# Patient Record
Sex: MALE | Race: AMERICAN INDIAN OR ALASKA NATIVE | NOT HISPANIC OR LATINO | URBAN - METROPOLITAN AREA
[De-identification: names, ages, dates, MRNs, and addresses within clinical notes are randomized per-mention and may not be internally consistent; named-entity substitution may affect disease eponyms.]

---

## 2020-02-16 ENCOUNTER — EMERGENCY (EMERGENCY)
Facility: HOSPITAL | Age: 20
LOS: 0 days | Discharge: HOME | End: 2020-02-16
Attending: EMERGENCY MEDICINE | Admitting: EMERGENCY MEDICINE
Payer: MEDICAID

## 2020-02-16 VITALS
RESPIRATION RATE: 20 BRPM | DIASTOLIC BLOOD PRESSURE: 93 MMHG | TEMPERATURE: 168 F | SYSTOLIC BLOOD PRESSURE: 146 MMHG | WEIGHT: 166.67 LBS | HEART RATE: 80 BPM | OXYGEN SATURATION: 98 %

## 2020-02-16 DIAGNOSIS — R20.2 PARESTHESIA OF SKIN: ICD-10-CM

## 2020-02-16 DIAGNOSIS — R20.0 ANESTHESIA OF SKIN: ICD-10-CM

## 2020-02-16 PROCEDURE — 99283 EMERGENCY DEPT VISIT LOW MDM: CPT

## 2020-02-16 NOTE — ED PROVIDER NOTE - ATTENDING CONTRIBUTION TO CARE
19yM p/w intermittent paresthesias - reports paresthesias at/below R jaw since last week - seen at [facility] and had stroke workup, discharged w/ neg findings.  Pt reports intact sensation but still intermittent paresthesias (occ w/ perceived difficulty swallowing, no drooling/stridor, no such sensation at present).  Reports transient paresthesias to L hand 4th/5th fingers a few days ago that has since resolved.  No trauma/fevers/difficulty ambulating.  Saw his PCP since [facility] visit and was told it was likely anxiety, but jaw paresthesias still intermittently occurring, so he came to the ED.    CONSTITUTIONAL: well developed; well nourished; well appearing in no acute distress  HEAD: normocephalic; atraumatic  EYES: PERRL, no conjunctival injection, no scleral icterus  ENT: no nasal discharge; airway clear.  NECK: supple; non tender. + full passive ROM in all directions  CARD: warm and well perfused, not tachycardic  RESP: breathing comfortably on RA, speaking in full sentences w/o distress  EXT: moving all extremities spontaneously, normal ROM. No clubbing, cyanosis or edema  SKIN: warm and dry, no lesions noted  NEURO: alert, oriented, CN II-XII grossly intact,motor and sensory grossly intact, speech nonslurred, FTN normal b/l, stable gait and tandem gait, no focal deficits. GCS 15  PSYCH: calm, cooperative, appropriate, good eye contact, logical thought process, no apparent danger to self or others

## 2020-02-16 NOTE — ED PROVIDER NOTE - CLINICAL SUMMARY MEDICAL DECISION MAKING FREE TEXT BOX
19yM p/w intermittent R jaw paresthesias in the setting of recent comprehensive stroke evaluation.  Pt well appearing w/ nonfocal neuro exam and no current social concerns or known high risk behaviors.  Recommend supportive care, o/p neuro f/u, return precautions.

## 2020-02-16 NOTE — ED PROVIDER NOTE - OBJECTIVE STATEMENT
20 yo male w/ h/o of high cholesterol diet controlled presents with right sided facial numbness. Symptoms began on Tuesday (5 day ago) with right sided numbness and tongue numbness. Patient went to [facility] and underwent stroke neurological evaluation and was cleared to follow up. The next day (4 days ago) patient went to his regular doctor and complained of the addition of leftt sided tingling in the two last fingers and right should muscle twitching. Pediatrician thought it was stress related. Now patient continues to have the right sided numbness on his face, the left sided hand tingling has resolved. He denies HA, n/v/d, blurry vision, change in vision, ringing in ears, dizziness, chest pain, SOB. Patient has had heart racing since prior to this happening. Patient denies alcohol, cigarettes, caffeine, marijuana, cocaine, or any other drugs.

## 2020-02-16 NOTE — ED PROVIDER NOTE - NS ED ROS FT
REVIEW OF SYSTEMS:  CONSTITUTIONAL: No weakness, fevers or chills  EYES/ENT: No visual changes;  No vertigo or throat pain   NECK: No pain or stiffness  RESPIRATORY: No cough, wheezing, hemoptysis; No shortness of breath  CARDIOVASCULAR: No chest pain or palpitations  GASTROINTESTINAL: No abdominal or epigastric pain. No nausea, vomiting, or hematemesis; No diarrhea or constipation. No melena or hematochezia.  GENITOURINARY: No dysuria, frequency or hematuria  NEUROLOGICAL: See hpi  SKIN: No itching, rashes

## 2020-02-16 NOTE — ED PROVIDER NOTE - CARE PLAN
Principal Discharge DX:	Numbness and tingling  Assessment and plan of treatment:	Neurology follow up Principal Discharge DX:	Paresthesias  Assessment and plan of treatment:	Neurology follow up

## 2020-02-16 NOTE — ED PROVIDER NOTE - PATIENT PORTAL LINK FT
You can access the FollowMyHealth Patient Portal offered by [facility] by registering at the following website: [email or web address]. By joining [facility]’s FollowMyHealth portal, you will also be able to view your health information using other applications (apps) compatible with our system.

## 2020-02-16 NOTE — ED PEDIATRIC TRIAGE NOTE - CHIEF COMPLAINT QUOTE
Pt presenting with left arm tingling and numbness since Wednesday.  Pt was seen at [facility] on Tuesday for right side facial numbness and tingling

## 2020-02-16 NOTE — ED PROVIDER NOTE - NSFOLLOWUPCLINICS_GEN_ALL_ED_FT
[facility]  Neurology  [address] [place] [address], [facility] [address]  [place] [address]  Phone: [phone]  Fax:   Follow Up Time:

## 2020-02-16 NOTE — ED PROVIDER NOTE - PHYSICAL EXAMINATION
General: well appearing, in no distress  HEENT: eyes PERRLA, TM visualized with no erythema or exudate, throat non erythematous, neck supple w/ FROM and no adenopathy  CVS: S1, S2 no murmurs  RESP: CTAB/L no wheezes, rhonchi or rales  AB: +BS, soft, nontender, nondistended  Neuro: Awake, alert and appropriate for age General: well appearing, in no distress  HEENT: eyes PERRLA, TM visualized with no erythema or exudate, throat non erythematous, neck supple w/ FROM and no adenopathy  CVS: S1, S2 no murmurs  RESP: CTAB/L no wheezes, rhonchi or rales  AB: +BS, soft, nontender, nondistended  Neuro: Awake, alert and appropriate for age. CN 2-12 intact, strength 5/5 through, sensation intact in all facial, extremity and truncal dermatomes, Triceps reflexes normal, patella reflexes normal, gait normal, neg trendelenburg, no spinal tenderness

## 2020-02-16 NOTE — ED PEDIATRIC NURSE NOTE - CHIEF COMPLAINT QUOTE
Pt presenting with left arm tingling and numbness since Wednesday.  Pt was seen at [facility] on Tuesday for right side facial numbness and tingling

## 2020-02-18 PROBLEM — Z00.00 ENCOUNTER FOR PREVENTIVE HEALTH EXAMINATION: Status: ACTIVE | Noted: 2020-02-18

## 2020-02-18 PROBLEM — Z78.9 OTHER SPECIFIED HEALTH STATUS: Chronic | Status: ACTIVE | Noted: 2020-02-16

## 2020-04-02 ENCOUNTER — APPOINTMENT (OUTPATIENT)
Dept: NEUROLOGY | Facility: CLINIC | Age: 20
End: 2020-04-02

## 2020-05-14 ENCOUNTER — TRANSCRIPTION ENCOUNTER (OUTPATIENT)
Age: 20
End: 2020-05-14

## 2020-05-14 ENCOUNTER — APPOINTMENT (OUTPATIENT)
Dept: NEUROLOGY | Facility: CLINIC | Age: 20
End: 2020-05-14
Payer: SELF-PAY

## 2020-05-14 DIAGNOSIS — R20.0 ANESTHESIA OF SKIN: ICD-10-CM

## 2020-05-14 DIAGNOSIS — R20.2 ANESTHESIA OF SKIN: ICD-10-CM

## 2020-05-14 PROCEDURE — 99203 OFFICE O/P NEW LOW 30 MIN: CPT | Mod: 95

## 2020-05-14 NOTE — HISTORY OF PRESENT ILLNESS
[Verbal consent obtained from patient] : the patient, [unfilled] [Time Spent: ___ minutes] : I have spent [unfilled] minutes with the patient on the telephone [FreeTextEntry1] : SERGE LYONS is a 20 year old M with no significant medical history is here to be seen as a new patient via TeleHealth visit. Needed to continue part of the visit via [employer] due to technical difficulties. He reports noticing symptoms of numbness affecting right side of his face, around his mouth and tongue lasting for few hours to one day. He was seen two times in [facility] where CT head was normal. In addition he reports intermittent numbness and tingling sensation in the left arm, forearm and  last three fingers of left hand and both of his feet. He denies any weakness or walking. He has no significant past medical or any significant medical history in his family. Sometimes he feels dizzy with the numbness.

## 2020-05-22 ENCOUNTER — APPOINTMENT (OUTPATIENT)
Dept: NEUROLOGY | Facility: CLINIC | Age: 20
End: 2020-05-22

## 2020-08-17 ENCOUNTER — APPOINTMENT (OUTPATIENT)
Dept: NEUROLOGY | Facility: CLINIC | Age: 20
End: 2020-08-17

## 2020-12-15 ENCOUNTER — NON-APPOINTMENT (OUTPATIENT)
Age: 20
End: 2020-12-15

## 2021-03-04 ENCOUNTER — APPOINTMENT (OUTPATIENT)
Dept: GASTROENTEROLOGY | Facility: CLINIC | Age: 21
End: 2021-03-04

## 2021-06-03 ENCOUNTER — APPOINTMENT (OUTPATIENT)
Dept: GASTROENTEROLOGY | Facility: CLINIC | Age: 21
End: 2021-06-03
Payer: MEDICAID

## 2021-06-03 DIAGNOSIS — Z82.49 FAMILY HISTORY OF ISCHEMIC HEART DISEASE AND OTHER DISEASES OF THE CIRCULATORY SYSTEM: ICD-10-CM

## 2021-06-03 DIAGNOSIS — R14.0 ABDOMINAL DISTENSION (GASEOUS): ICD-10-CM

## 2021-06-03 DIAGNOSIS — Z78.9 OTHER SPECIFIED HEALTH STATUS: ICD-10-CM

## 2021-06-03 DIAGNOSIS — R10.9 UNSPECIFIED ABDOMINAL PAIN: ICD-10-CM

## 2021-06-03 PROCEDURE — 99204 OFFICE O/P NEW MOD 45 MIN: CPT | Mod: 95

## 2021-06-03 NOTE — PHYSICAL EXAM
[General Appearance - Alert] : alert [Hearing Threshold Finger Rub Not [place]] : hearing was normal [] : no respiratory distress [Skin Color & Pigmentation] : normal skin color and pigmentation [Oriented To Time, Place, And Person] : oriented to person, place, and time

## 2021-06-03 NOTE — ASSESSMENT
[FreeTextEntry1] : 21 year old male patient with 1 year lower left abdominal pain, crampy in nature, associated with bloating, no change in bowel habits. \par No weight loss, blood in the stool, nausea, vomiting or dysphagia. No GERD. \par Had hx of H Pylori which was eradicated last year. \par \par Likely IBS\par will get US of abdomen

## 2021-06-03 NOTE — HISTORY OF PRESENT ILLNESS
[Home] : at home, [unfilled] , at the time of the visit. [Verbal consent obtained from patient] : the patient, [unfilled] [Medical Office: ([facility])___] : at the medical office located in  [FreeTextEntry4] : Elizabeth Greene [de-identified] : 21 year old male patient with 1 year lower left abdominal pain, crampy in nature, associated with bloating, no change in bowel habits. \par No weight loss, blood in the stool, nausea, vomiting or dysphagia. No GERD. \par Had hx of H Pylori which was eradicated last year.

## 2021-06-29 ENCOUNTER — OUTPATIENT (OUTPATIENT)
Dept: OUTPATIENT SERVICES | Facility: HOSPITAL | Age: 21
LOS: 1 days | Discharge: HOME | End: 2021-06-29
Payer: MEDICAID

## 2021-06-29 DIAGNOSIS — R10.9 UNSPECIFIED ABDOMINAL PAIN: ICD-10-CM

## 2021-06-29 PROCEDURE — 76700 US EXAM ABDOM COMPLETE: CPT | Mod: 26

## 2022-09-26 NOTE — ED PEDIATRIC TRIAGE NOTE - ESI TRIAGE ACUITY LEVEL, MLM
Pt was very labile throughout the day.  Attempted many bargaining maneuvers to try to get what he wanted.  Displayed verbal aggression throughout the day and had to be redirected multiple times.  Pt did agree to take his Zyprexa but stated he would not take it again later in the day.  PT has been updated on plan of care.     3

## 2023-08-24 ENCOUNTER — EMERGENCY (EMERGENCY)
Facility: HOSPITAL | Age: 23
LOS: 0 days | Discharge: HOME | End: 2023-08-25
Attending: STUDENT IN AN ORGANIZED HEALTH CARE EDUCATION/TRAINING PROGRAM
Payer: MEDICAID

## 2023-08-24 DIAGNOSIS — S60.512A ABRASION OF LEFT HAND, INITIAL ENCOUNTER: ICD-10-CM

## 2023-08-24 DIAGNOSIS — M25.522 PAIN IN LEFT ELBOW: ICD-10-CM
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
## 2023-08-24 PROCEDURE — 73090 X-RAY EXAM OF FOREARM: CPT | Mod: LT

## 2023-08-24 PROCEDURE — 73070 X-RAY EXAM OF ELBOW: CPT | Mod: LT

## 2023-08-24 PROCEDURE — 29105 APPLICATION LONG ARM SPLINT: CPT | Mod: LT

## 2023-08-24 PROCEDURE — 99284 EMERGENCY DEPT VISIT MOD MDM: CPT | Mod: 25

## 2023-08-25 VITALS
SYSTOLIC BLOOD PRESSURE: 132 MMHG | DIASTOLIC BLOOD PRESSURE: 76 MMHG | WEIGHT: 190.04 LBS | TEMPERATURE: 99 F | RESPIRATION RATE: 18 BRPM | OXYGEN SATURATION: 97 % | HEART RATE: 106 BPM

## 2023-08-25 VITALS — HEART RATE: 70 BPM

## 2023-08-25 PROCEDURE — 73090 X-RAY EXAM OF FOREARM: CPT | Mod: 26,LT

## 2023-08-25 PROCEDURE — 73070 X-RAY EXAM OF ELBOW: CPT | Mod: 26,LT

## 2023-08-25 RX ORDER — IBUPROFEN 200 MG
600 TABLET ORAL ONCE
Refills: 0 | Status: COMPLETED | OUTPATIENT
Start: 2023-08-25 | End: 2023-08-25

## 2023-08-25 RX ADMIN — Medication 600 MILLIGRAM(S): at 01:12

## 2023-08-25 NOTE — ED ADULT NURSE NOTE - NSFALLUNIVINTERV_ED_ALL_ED
Bed/Stretcher in lowest position, wheels locked, appropriate side rails in place/Call bell, personal items and telephone in reach/Instruct patient to call for assistance before getting out of bed/chair/stretcher/Non-slip footwear applied when patient is off stretcher/[place] to call system/Physically safe environment - no spills, clutter or unnecessary equipment/Purposeful proactive rounding/Room/bathroom lighting operational, light cord in reach

## 2023-08-25 NOTE — ED PROVIDER NOTE - ATTENDING CONTRIBUTION TO CARE
I was present for and supervised the key and critical aspects of the procedures performed during the care of the patient.    see mdm

## 2023-08-25 NOTE — ED PROVIDER NOTE - PATIENT PORTAL LINK FT
You can access the FollowMyHealth Patient Portal offered by [facility] by registering at the following website: [email or web address]. By joining [facility]’s FollowMyHealth portal, you will also be able to view your health information using other applications (apps) compatible with our system.

## 2023-08-25 NOTE — ED PROVIDER NOTE - CLINICAL SUMMARY MEDICAL DECISION MAKING FREE TEXT BOX
Imaging was ordered and reviewed by me.  Appropriate medications for patient's presenting complaints were ordered and effects were reassessed.Additional history was obtained from patient's partner. Imaging was ordered and reviewed by me.  Appropriate medications for patient's presenting complaints were ordered and effects were reassessed. Additional history was obtained from patient's partner. Patient splinted and given ortho follow up. Discussed preliminary results. Stable for outpatient follow up.

## 2023-08-25 NOTE — ED PROVIDER NOTE - PHYSICAL EXAMINATION
VITAL SIGNS: I have reviewed nursing notes and confirm.  CONSTITUTIONAL: Well-appearing, non-toxic, in NAD  SKIN: Warm dry, normal skin turgor  HEAD: NCAT. +old left periorbital abrasion   EYES: No conjunctival injection, scleral anicteric  ENT: Moist mucous membranes,   NECK: Supple; full ROM. Nontender.   EXT: +swelling to medial left elbow with tenderness over olecranon without obvious deformity or dislocation. +abrasion to left knuckles without underlying tenderness.   NEURO: Normal motor, normal sensory. CN II-XII grossly intact. Normal gait.  PSYCH: Cooperative, appropriate.

## 2023-08-25 NOTE — ED PROVIDER NOTE - CARE PLAN
Assessment and plan of treatment:	imaging, supportive care   1 Principal Discharge DX:	Elbow pain, left  Assessment and plan of treatment:	imaging, supportive care

## 2023-08-26 ENCOUNTER — EMERGENCY (EMERGENCY)
Facility: HOSPITAL | Age: 23
LOS: 0 days | Discharge: HOME | End: 2023-08-26
Attending: STUDENT IN AN ORGANIZED HEALTH CARE EDUCATION/TRAINING PROGRAM
Payer: MEDICAID

## 2023-08-26 VITALS
SYSTOLIC BLOOD PRESSURE: 146 MMHG | DIASTOLIC BLOOD PRESSURE: 83 MMHG | OXYGEN SATURATION: 99 % | HEART RATE: 59 BPM | TEMPERATURE: 99 F | RESPIRATION RATE: 18 BRPM | WEIGHT: 190.04 LBS

## 2023-08-26 VITALS — TEMPERATURE: 97 F

## 2023-08-26 DIAGNOSIS — M25.522 PAIN IN LEFT ELBOW: ICD-10-CM

## 2023-08-26 DIAGNOSIS — M79.89 OTHER SPECIFIED SOFT TISSUE DISORDERS: ICD-10-CM

## 2023-08-26 DIAGNOSIS — R20.0 ANESTHESIA OF SKIN: ICD-10-CM

## 2023-08-26 DIAGNOSIS — M79.18 MYALGIA, OTHER SITE: ICD-10-CM

## 2023-08-26 PROCEDURE — 99283 EMERGENCY DEPT VISIT LOW MDM: CPT

## 2023-08-26 PROCEDURE — 99282 EMERGENCY DEPT VISIT SF MDM: CPT

## 2023-08-26 NOTE — ED ADULT NURSE NOTE - CHIEF COMPLAINT QUOTE
pt presents w/ wrapped arm states there is no break or fracture feels that his left thumb and pointer are swollen

## 2023-08-26 NOTE — ED PROVIDER NOTE - NSFOLLOWUPCLINICS_GEN_ALL_ED_FT
[facility]  Orthpedic  [address], [place]   Phone: [phone]  Fax:   Follow Up Time: Routine

## 2023-08-26 NOTE — ED PROVIDER NOTE - CLINICAL SUMMARY MEDICAL DECISION MAKING FREE TEXT BOX
23-year-old male presenting today with left thumb and finger swelling.  Patient is hemodynamically stable and well-appearing on evaluation.  Patient is neurovascularly intact at this time.  Upon review of previous imaging, patient has no acute fractures or dislocations.  Splint was removed, patient's sensation and strength was intact and patient was discharged to follow-up with orthopedics.  Return precautions explained to patient.

## 2023-08-26 NOTE — ED ADULT NURSE NOTE - NSFALLUNIVINTERV_ED_ALL_ED
Bed/Stretcher in lowest position, wheels locked, appropriate side rails in place/Call bell, personal items and telephone in reach/Instruct patient to call for assistance before getting out of bed/chair/stretcher/Non-slip footwear applied when patient is off stretcher/[place] to call system/Physically safe environment - no spills, clutter or unnecessary equipment/Purposeful proactive rounding/Room/bathroom lighting operational, light cord in reach

## 2023-08-26 NOTE — ED PROVIDER NOTE - PHYSICAL EXAMINATION
VITAL SIGNS: I have reviewed nursing notes and confirm.  CONSTITUTIONAL: Well-developed; well-nourished; in no acute distress.  SKIN: Skin exam is warm and dry, no acute rash.  HEAD: Normocephalic; atraumatic.  ENT: mmm  EXT: LUE in posterior splint, removed by me. No swelling/discoloration noted to fingers, reports slight decreased sensation to index and thumb. FROM at all digits and joints. No skin changes, 2+ RP  NEURO: Alert, oriented. Grossly unremarkable. No focal deficits.  PSYCH: Cooperative, appropriate.

## 2023-08-26 NOTE — ED PROVIDER NOTE - OBJECTIVE STATEMENT
24 y/o M with no PMH, splint application yesterday to L elbow for pain s/p fall presents back to ED for evaluation of L thumb and index finger swelling. Pt reports since splint application has noted mild swelling and numbness to these 2 fingers. Denies new injury. Has no other complaints. XR has since officially been read as normal by radiologists

## 2023-08-26 NOTE — ED PROVIDER NOTE - PATIENT PORTAL LINK FT
You can access the FollowMyHealth Patient Portal offered by [facility] by registering at the following website: [email or web address]. By joining [facility]’s FollowMyHealth portal, you will also be able to view your health information using other applications (apps) compatible with our system.

## 2024-01-31 ENCOUNTER — EMERGENCY (EMERGENCY)
Facility: HOSPITAL | Age: 24
LOS: 0 days | Discharge: HOME | End: 2024-01-31
Attending: EMERGENCY MEDICINE
Payer: MEDICAID

## 2024-01-31 VITALS
TEMPERATURE: 98 F | RESPIRATION RATE: 16 BRPM | DIASTOLIC BLOOD PRESSURE: 74 MMHG | HEART RATE: 69 BPM | OXYGEN SATURATION: 100 % | SYSTOLIC BLOOD PRESSURE: 157 MMHG

## 2024-01-31 DIAGNOSIS — R07.89 OTHER CHEST PAIN: ICD-10-CM

## 2024-01-31 DIAGNOSIS — R05.3 CHRONIC COUGH: ICD-10-CM

## 2024-01-31 DIAGNOSIS — Z90.49 ACQUIRED ABSENCE OF OTHER SPECIFIED PARTS OF DIGESTIVE TRACT: ICD-10-CM

## 2024-01-31 DIAGNOSIS — Z86.19 PERSONAL HISTORY OF OTHER INFECTIOUS AND PARASITIC DISEASES: ICD-10-CM

## 2024-01-31 PROCEDURE — 93005 ELECTROCARDIOGRAM TRACING: CPT

## 2024-01-31 PROCEDURE — 93010 ELECTROCARDIOGRAM REPORT: CPT

## 2024-01-31 PROCEDURE — 99283 EMERGENCY DEPT VISIT LOW MDM: CPT | Mod: 25

## 2024-01-31 PROCEDURE — 99284 EMERGENCY DEPT VISIT MOD MDM: CPT

## 2024-01-31 PROCEDURE — 71046 X-RAY EXAM CHEST 2 VIEWS: CPT

## 2024-01-31 PROCEDURE — 71046 X-RAY EXAM CHEST 2 VIEWS: CPT | Mod: 26

## 2024-01-31 RX ORDER — ACETAMINOPHEN 500 MG
650 TABLET ORAL ONCE
Refills: 0 | Status: COMPLETED | OUTPATIENT
Start: 2024-01-31 | End: 2024-01-31

## 2024-01-31 RX ADMIN — Medication 650 MILLIGRAM(S): at 20:41

## 2024-01-31 NOTE — ED PROVIDER NOTE - NSFOLLOWUPINSTRUCTIONS_ED_ALL_ED_FT
Costochondritis  Costochondritis is swelling and irritation (inflammation) of the tissue (cartilage) that connects your ribs to your breastbone (sternum). This causes pain in the front of your chest. Usually, the pain:  Starts gradually.  Is in more than one rib.  This condition usually goes away on its own over time.    Follow these instructions at home:  Do not do anything that makes your pain worse.  If directed, put ice on the painful area:  Put ice in a plastic bag.  Place a towel between your skin and the bag.  Leave the ice on for 20 minutes, 2–3 times a day.  If directed, put heat on the affected area as often as told by your doctor. Use the heat source that your doctor tells you to use, such as a moist heat pack or a heating pad.  Place a towel between your skin and the heat source.  Leave the heat on for 20–30 minutes.  Take off the heat if your skin turns bright red. This is very important if you cannot feel pain, heat, or cold. You may have a greater risk of getting burned.  Take over-the-counter and prescription medicines only as told by your doctor.  Return to your normal activities as told by your doctor. Ask your doctor what activities are safe for you.  Keep all follow-up visits as told by your doctor. This is important.  Contact a doctor if:  You have chills or a fever.  Your pain does not go away or it gets worse.  You have a cough that does not go away.  Get help right away if:  You are short of breath.  This information is not intended to replace advice given to you by your health care provider. Make sure you discuss any questions you have with your health care provider.

## 2024-01-31 NOTE — ED ADULT NURSE NOTE - NSFALLUNIVINTERV_ED_ALL_ED
Bed/Stretcher in lowest position, wheels locked, appropriate side rails in place/Call bell, personal items and telephone in reach/Instruct patient to call for assistance before getting out of bed/chair/stretcher/Non-slip footwear applied when patient is off stretcher/[place] to call system/Physically safe environment - no spills, clutter or unnecessary equipment/Purposeful proactive rounding/Room/bathroom lighting operational, light cord in reach

## 2024-01-31 NOTE — ED PROVIDER NOTE - CLINICAL SUMMARY MEDICAL DECISION MAKING FREE TEXT BOX
23-year-old male no past medical history non-smoker no family history status post appendectomy presents with diffuse chest tightness since Sunday morning.  Patient states Saturday night he was playing with his friend and he tripped and fell and his friend fell on top of him.  Woke up next morning with pain.  Tightness discomfort needing substernal.  No fever cough.  No tearing back pain.  No shortness of breath.  No leg pain swelling immobilization normal to mopped assist.  Pain is worse with movement.    On exam, AFVSS, Well appearing, No acute distress, NCAT, EOMI, PERRLA, MMM, Neck supple, LCTAB, RRR nl s1s2 No mrg, Abdomen Soft NTND, AAOx3, No Focal Deficits, No LE edema or calf TTP,    A/P; chest pain, EKG 63 NSR, no stemi, CXR clear, PERC negative, possible musculoskeletal, low risk ACS, Tylenol as needed DC home supportive care advised follow-up PMD 1 to 2 weeks, strict return precautions provided    ED CXR prelim, my independent interpretation - Dr. Francie Nina: [No PTX, No infiltrates, No Free air]   ED EKG: my independent interpretation - Dr. Francie Nina : [63 NSR, no STEMI]

## 2024-01-31 NOTE — ED PROVIDER NOTE - ATTENDING CONTRIBUTION TO CARE
23-year-old male no past medical history non-smoker no family history status post appendectomy presents with diffuse chest tightness since Sunday morning.  Patient states Saturday night he was playing with his friend and he tripped and fell and his friend fell on top of him.  Woke up next morning with pain.  Tightness discomfort needing substernal.  No fever cough.  No tearing back pain.  No shortness of breath.  No leg pain swelling immobilization normal to mopped assist.  Pain is worse with movement.    On exam, AFVSS, Well appearing, No acute distress, NCAT, EOMI, PERRLA, MMM, Neck supple, LCTAB, RRR nl s1s2 No mrg, Abdomen Soft NTND, AAOx3, No Focal Deficits, No LE edema or calf TTP,    A/P; chest pain, EKG 63 NSR, no stemi, CXR clear, PERC negative, possible musculoskeletal, low risk ACS, Tylenol as needed DC home supportive care advised follow-up PMD 1 to 2 weeks, strict return precautions provided

## 2024-01-31 NOTE — ED ADULT TRIAGE NOTE - CHIEF COMPLAINT QUOTE
pt c/o chest discomfort since Sat night when his friend fell on top of his chest   denies SOB/cough/chest pain in triage

## 2024-01-31 NOTE — ED PROVIDER NOTE - PHYSICAL EXAMINATION
VITAL SIGNS: I have reviewed nursing notes and confirm.  CONSTITUTIONAL: well-appearing, non-toxic, NAD  SKIN: Warm dry, normal skin turgor, no bruising, no rash  HEAD: NCAT  EYES: EOMI, PERRLA, no scleral icterus  ENT: Moist mucous membranes, normal pharynx with no erythema or exudates  NECK: Supple; non tender. Full ROM. No cervical LAD  CARD: RRR, no murmurs, rubs or gallops, no chest wall tenderness  RESP: clear to ausculation b/l.  No rales, rhonchi, or wheezing. nonproductive cough  ABD: soft, + BS, non-tender, non-distended, no rebound or guarding. No CVA tenderness  EXT: Full ROM, no bony tenderness, no pedal edema, no calf tenderness  NEURO: normal motor. normal sensory. CN II-XII intact. Cerebellar testing normal. Normal gait.  PSYCH: Cooperative, appropriate.

## 2024-01-31 NOTE — ED PROVIDER NOTE - PATIENT PORTAL LINK FT
You can access the FollowMyHealth Patient Portal offered by [facility] by registering at the following website: [email or web address]. By joining [facility]’s FollowMyHealth portal, you will also be able to view your health information using other applications (apps) compatible with our system.

## 2024-01-31 NOTE — ED PROVIDER NOTE - OBJECTIVE STATEMENT
23-year-old male with no past medical history presents for chest discomfort.  Reports he was "playing around with [his] friend when he sat on [his] chest" 5 days ago.  But he felt chest soreness the next day.  Endorses a chronic cough since his URI few weeks ago.  No fevers, chills, palpitations, shortness of breath, sore throat, congestion, nausea, vomiting, diarrhea, vomiting, weakness, numbness, recent travel, leg swelling.  Denies tobacco use, substance use.  Endorses social alcohol use.  No family history of heart problems.

## 2024-06-11 ENCOUNTER — APPOINTMENT (OUTPATIENT)
Dept: NEUROLOGY | Facility: CLINIC | Age: 24
End: 2024-06-11
Payer: COMMERCIAL

## 2024-06-11 VITALS
DIASTOLIC BLOOD PRESSURE: 84 MMHG | HEART RATE: 59 BPM | BODY MASS INDEX: 24.38 KG/M2 | WEIGHT: 180 LBS | SYSTOLIC BLOOD PRESSURE: 122 MMHG | HEIGHT: 72 IN

## 2024-06-11 DIAGNOSIS — R93.89 ABNORMAL FINDINGS ON DIAGNOSTIC IMAGING OF OTHER SPECIFIED BODY STRUCTURES: ICD-10-CM

## 2024-06-11 DIAGNOSIS — G37.9 DEMYELINATING DISEASE OF CENTRAL NERVOUS SYSTEM, UNSPECIFIED: ICD-10-CM

## 2024-06-11 DIAGNOSIS — M54.12 RADICULOPATHY, CERVICAL REGION: ICD-10-CM

## 2024-06-11 PROCEDURE — 99203 OFFICE O/P NEW LOW 30 MIN: CPT

## 2024-06-11 NOTE — HISTORY OF PRESENT ILLNESS
[FreeTextEntry1] : It's a pleasure to see . SERGE LYONS In the office today. He is a 24 year -  old man  who presents to the office today for the evaluation of intermittent eye pain, headache as well as neck pain.  4 years ago he has seen a neurologist for this and MRI of the brain showed a single lesion in the left thalamus which could be suggestive of demyelinating disease.  Patient declined lumbar puncture at that time and was told that he needed routine monitoring.  Patient has not gone back for repeat MRIs.  In terms of his neck pain he denies any pain shooting down her arms but does have pain  shooting to the side of his neck.  He denies any weakness, numbness, urinary/bowel incontinence

## 2024-06-11 NOTE — ASSESSMENT
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

## 2024-07-22 ENCOUNTER — APPOINTMENT (OUTPATIENT)
Dept: NEUROLOGY | Facility: CLINIC | Age: 24
End: 2024-07-22
Payer: COMMERCIAL

## 2024-07-22 VITALS
HEART RATE: 51 BPM | DIASTOLIC BLOOD PRESSURE: 85 MMHG | HEIGHT: 72 IN | WEIGHT: 180 LBS | SYSTOLIC BLOOD PRESSURE: 132 MMHG | BODY MASS INDEX: 24.38 KG/M2

## 2024-07-22 DIAGNOSIS — M54.2 CERVICALGIA: ICD-10-CM

## 2024-07-22 DIAGNOSIS — R51.9 HEADACHE, UNSPECIFIED: ICD-10-CM

## 2024-07-22 PROCEDURE — 99214 OFFICE O/P EST MOD 30 MIN: CPT

## 2024-07-22 NOTE — PHYSICAL EXAM
[Person] : oriented to person [Place] : oriented to place [Time] : oriented to time [Concentration Intact] : normal concentrating ability [Visual Intact] : visual attention was ~T not ~L decreased [Naming Objects] : no difficulty naming common objects [Repeating Phrases] : no difficulty repeating a phrase [Writing A Sentence] : no difficulty writing a sentence [Fluency] : fluency intact [Comprehension] : comprehension intact [Reading] : reading intact [Past History] : adequate knowledge of personal past history [Cranial Nerves Optic (II)] : visual acuity intact bilaterally,  visual fields full to confrontation, pupils equal round and reactive to light [Cranial Nerves Oculomotor (III)] : extraocular motion intact [Cranial Nerves Trigeminal (V)] : facial sensation intact symmetrically [Cranial Nerves Facial (VII)] : face symmetrical [Cranial Nerves Vestibulocochlear (VIII)] : hearing was intact bilaterally [Cranial Nerves Glossopharyngeal (IX)] : tongue and palate midline [Cranial Nerves Accessory (XI - Cranial And Spinal)] : head turning and shoulder shrug symmetric [Cranial Nerves Hypoglossal (XII)] : there was no tongue deviation with protrusion [Motor Strength] : muscle strength was normal in all four extremities [No Muscle Atrophy] : normal bulk in all four extremities [Sensation Tactile Decrease] : light touch was intact [Balance] : balance was intact [2+] : Ankle jerk left 2+ [Motor Strength Upper Extremities Bilaterally] : strength was normal in both upper extremities [Motor Strength Lower Extremities Bilaterally] : strength was normal in both lower extremities [Past-pointing] : there was no past-pointing [Tremor] : no tremor present [Plantar Reflex Right Only] : normal on the right [Plantar Reflex Left Only] : normal on the left [FreeTextEntry6] : 5/5 throughout  [PERRL With Normal Accommodation] : pupils were equal in size, round, reactive to light, with normal accommodation [Extraocular Movements] : extraocular movements were intact [Neck Appearance] : the appearance of the neck was normal [No Spinal Tenderness] : no spinal tenderness [Abnormal Walk] : normal gait [Involuntary Movements] : no involuntary movements were seen [Motor Tone] : muscle strength and tone were normal

## 2024-07-22 NOTE — REVIEW OF SYSTEMS
[Arm Weakness] : no arm weakness [Hand Weakness] : no hand weakness [Leg Weakness] : no leg weakness [Poor Coordination] : good coordination [Dizziness] : no dizziness [Vertigo] : no vertigo [Migraine Headache] : migraine headaches [Tension Headache] : tension-type headaches [Eyesight Problems] : eyesight problems [Negative] : Heme/Lymph [FreeTextEntry3] : new glasses

## 2024-07-22 NOTE — ASSESSMENT
[FreeTextEntry1] : 24 year old male initially seen for intermittent eye pain, headache and occasional neck pain.  MRI of the Brain done 4 year ago showed a single lesion in the left thalamus suggestive of demyelinating disease. Today we reviewed the results of his MRI of the Brain and Cervical spine with and without contrast. MRI cervical spine did not reveal evidence of demyelinating disease or spinal cord involvement and signal change to the left thalamus was redemonstrated on MRI Brain, although nonspecific meeting criteria for chronic migraines. Patients eye pain and headaches have since improved. for his neck pain he will continue PT and conservative management as discussed. He will RTO as needed.   Supervising Physician : Gadiel Lazo,

## 2024-07-22 NOTE — HISTORY OF PRESENT ILLNESS
[FreeTextEntry1] : Original presentation : It's a pleasure to see Mr. SERGE LYONS In the office today. He is a 24 year -  old man  who presents to the office today for the evaluation of intermittent eye pain, headache as well as neck pain.  4 years ago he has seen a neurologist for this and MRI of the brain showed a single lesion in the left thalamus which could be suggestive of demyelinating disease.  Patient declined lumbar puncture at that time and was told that he needed routine monitoring.  Patient has not gone back for repeat MRIs.  In terms of his neck pain he denies any pain shooting down her arms but does have pain  shooting to the side of his neck.  He denies any weakness, numbness, urinary/bowel incontinence  Diagnostic Testing :  MRI Brain wwo contrast 7.9.24 ; Minimal area of increased signal intensity within the left thalamus representing a nonspecific finding meeting the criteria for migraine headaches.  MRI Cervical Spine wwo contrast 7.15.24 : No evidence of demyelinating disease involving the cervical spinal cord.    Today : Today I had the pleasure of seeing Mr. LYONS in our office for follow up.  Their past medical history and imaging have been reviewed.    Patient  remains under our neurologic care for evaluation of intermittent eye pain, headache and occasional neck pain due to muscle stiffness.  4 years ago he saw a neurologist for this and reports that an MRI of the brain showed a single lesion in the left thalamus which could be suggestive of demyelinating disease. Today we reviewed the results of his MRI of the Brain and Cervical spine with and without contrast. MRI cervical spine did not reveal evidence of demyelinating disease or spinal cord involvement. MRI of the Brain redemonstrated a minimal area of increased signal intensity within the left thalamus however this finding was nonspecific and meets the criteria for migraine headaches which is consistent with the patients clinical presentation. Since his initial visit, patient has seen his eye doctor and was prescribed new glasses which he states has helped both his eye pain and headaches. With respect to his neck pain, patient has begun physical therapy but has only attended 2 sessions. He denies trauma, radiation down the arms, motor weakness or sensory changes. On exam he maintains 5/5 power to all extremities, has full cervical and spinal ROM, reflexes 2+ throughout and ambulates independently without difficulty.